# Patient Record
Sex: FEMALE
[De-identification: names, ages, dates, MRNs, and addresses within clinical notes are randomized per-mention and may not be internally consistent; named-entity substitution may affect disease eponyms.]

---

## 2020-04-13 ENCOUNTER — HOSPITAL ENCOUNTER (EMERGENCY)
Dept: HOSPITAL 43 - DL.ED | Age: 40
Discharge: NURSING, REHAB OR LONG-TERM CARE | End: 2020-04-13
Payer: COMMERCIAL

## 2020-04-13 DIAGNOSIS — R56.9: Primary | ICD-10-CM

## 2020-04-13 LAB
ANION GAP SERPL CALC-SCNC: 14.2 MEQ/L (ref 7–13)
CHLORIDE SERPL-SCNC: 104 MMOL/L (ref 98–107)
SODIUM SERPL-SCNC: 141 MMOL/L (ref 136–145)

## 2020-04-13 PROCEDURE — 81003 URINALYSIS AUTO W/O SCOPE: CPT

## 2020-04-13 PROCEDURE — 80305 DRUG TEST PRSMV DIR OPT OBS: CPT

## 2020-04-13 PROCEDURE — 85025 COMPLETE CBC W/AUTO DIFF WBC: CPT

## 2020-04-13 PROCEDURE — 99285 EMERGENCY DEPT VISIT HI MDM: CPT

## 2020-04-13 PROCEDURE — 70450 CT HEAD/BRAIN W/O DYE: CPT

## 2020-04-13 PROCEDURE — 80053 COMPREHEN METABOLIC PANEL: CPT

## 2020-04-13 PROCEDURE — 80307 DRUG TEST PRSMV CHEM ANLYZR: CPT

## 2020-04-13 PROCEDURE — 80185 ASSAY OF PHENYTOIN TOTAL: CPT

## 2020-04-13 PROCEDURE — 81025 URINE PREGNANCY TEST: CPT

## 2020-04-13 PROCEDURE — 96365 THER/PROPH/DIAG IV INF INIT: CPT

## 2020-04-13 PROCEDURE — 36415 COLL VENOUS BLD VENIPUNCTURE: CPT

## 2020-04-13 PROCEDURE — 99284 EMERGENCY DEPT VISIT MOD MDM: CPT

## 2020-04-13 NOTE — EDM.PDOC
ED HPI GENERAL MEDICAL PROBLEM





- General


Chief Complaint: Neuro Symptoms/Deficits


Time Seen by Provider: 04/13/20 21:05


Source of Information: Reports: Patient, EMS, EMS Notes Reviewed, Family, RN, 

RN Notes Reviewed


History Limitations: Reports: Altered Mental Status





- History of Present Illness


INITIAL COMMENTS - FREE TEXT/NARRATIVE: 


patient presents to ER per [facility] with complaint of seizure. Family called the 

ambulance as the patient had had a seizure at home that lasted an unknown 

amount of time. Patient was alert but very postictal when EMS arrived, was 

uncooperative when EMS attempted to touch the patient. EMS reports the patient 

had a seizure in their presence that lasted approximately 30 seconds while 

still in the home, and EMS did give him Versed 5 mg. EMS reported another 30 

second seizure en route to the hospital. Family states the patient has a 

seizure disorder and takes Dilantin. Unknown any other past health history. 

Family denies any recent trauma per EMS. 


GCS: 7





Onset: Today, Sudden





- Related Data


 Allergies











Allergy/AdvReac Type Severity Reaction Status Date / Time


 


Unable to Assess Allergy   Unverified 04/13/20 21:00











Home Meds: 


 Home Meds





. [Unable to Verify Home Med List]  04/13/20 [History]











ED ROS GENERAL





- Review of Systems


Review Of Systems: Comprehensive ROS is negative, except as noted in HPI.





- Physical Exam


Exam: See Below


Exam Limited By: Altered Mental Status


General Appearance: Obtunded, Other (snoring respirations)


Eye Exam: Bilateral Eye: EOMI, Normal Inspection, PERRL (3 BRISk)


Ears: Normal External Exam, Hearing Grossly Normal


Nose: Normal Inspection


Throat/Mouth: Normal Inspection, Normal Voice, No Airway Compromise


Head Exam: Atraumatic, Normocephalic


Neck: Normal Inspection, Supple, Non-Tender, Full Range of Motion


Respiratory/Chest: No Respiratory Distress, Lungs Clear, Normal Breath Sounds, 

No Accessory Muscle Use, Chest Non-Tender


Cardiovascular: Normal Peripheral Pulses, Regular Rate, Rhythm, No Edema, No 

Gallop, No JVD, No Murmur, No Rub


GI/Abdominal: Normal Bowel Sounds, Soft, Non-Tender, No Organomegaly, No 

Distention, No Abnormal Bruit, No Mass


 (Female) Exam: Deferred


Rectal (Female) Exam: Deferred


Neuro Exam (Abbreviated): Confused, Disoriented


Back Exam: Normal Inspection, Full Range of Motion, NT


Extremities: Normal Inspection, Normal Range of Motion, Non-Tender, No Pedal 

Edema, Normal Capillary Refill


Psychiatric: Flat Affect, Other (Patient sleeps with snoring respirations, 

easily arouses, but is combative when awake)


Skin Exam: Warm, Dry, Intact, Normal Color, No Rash





Course





- Vital Signs


Last Recorded V/S: 


 Last Vital Signs











Temp  97.5 F   04/13/20 21:03


 


Pulse  71   04/13/20 21:03


 


Resp  24 H  04/13/20 21:03


 


BP  115/72   04/13/20 21:03


 


Pulse Ox  98   04/13/20 21:03














- Orders/Labs/Meds


Orders: 


 Active Orders 24 hr











 Category Date Time Status


 


 Peripheral IV Care [RC] .AS DIRECTED Care  04/13/20 20:57 Active


 


 Head wo Cont [CT] Urgent Exams  04/13/20 21:32 Taken


 


 Sodium Chloride 0.9% [Normal Saline] 1,000 ml Med  04/13/20 23:30 Active





 IV ASDIRECTED   


 


 Sodium Chloride 0.9% [Saline Flush] Med  04/13/20 20:57 Active





 10 ml FLUSH ASDIRECTED PRN   


 


 Peripheral IV Insertion Adult [OM.PC] Stat Oth  04/13/20 20:56 Ordered


 


 Seizure Precautions [OM.PC] Routine Oth  04/13/20 21:43 Ordered








 Medication Orders





Sodium Chloride (Normal Saline)  1,000 mls @ 150 mls/hr IV ASDIRECTED JG


   Last Admin: 04/13/20 23:26  Dose: 150 mls/hr


Sodium Chloride (Saline Flush)  10 ml FLUSH ASDIRECTED PRN


   PRN Reason: Keep Vein Open


   Last Admin: 04/13/20 21:11  Dose: 10 ml








Labs: 


 Laboratory Tests











  04/13/20 04/13/20 04/13/20 Range/Units





  21:13 21:13 21:13 


 


WBC  8.8    (5.0-10.0)  10^3/uL


 


RBC  4.91    (4.2-5.4)  10^6/uL


 


Hgb  13.4    (12.0-16.0)  g/dL


 


Hct  41.6    (37.0-47.0)  %


 


MCV  84.7    ()  fL


 


MCH  27.3    (27.0-34.0)  pg


 


MCHC  32.2 L    (33.0-35.0)  g/dL


 


Plt Count  255    (150-450)  10^3/uL


 


Neut % (Auto)  76.4 H    (42.2-75.2)  %


 


Lymph % (Auto)  13.2 L    (20.5-50.1)  %


 


Mono % (Auto)  8.8 H    (2-8)  %


 


Eos % (Auto)  1.5    (1.0-3.0)  %


 


Baso % (Auto)  0.1    (0.0-1.0)  %


 


Sodium   141   (136-145)  mmol/L


 


Potassium   4.2   (3.5-5.1)  mmol/L


 


Chloride   104   ()  mmol/L


 


Carbon Dioxide   27   (21-32)  mmol/L


 


Anion Gap   14.2 H   (7-13)  mEq/L


 


BUN   11   (7-18)  mg/dL


 


Creatinine   0.92   (0.55-1.02)  mg/dL


 


Est Cr Clr Drug Dosing   TNP   


 


Estimated GFR (MDRD)   54   


 


BUN/Creatinine Ratio   12.0   (No establ ref range)  


 


Glucose   88   (74-99)  mg/dL


 


Calcium   8.7   (8.5-10.1)  mg/dL


 


Total Bilirubin   0.5   (0.2-1.0)  mg/dL


 


AST   20   (15-37)  U/L


 


ALT   30   (14-59)  U/L


 


Alkaline Phosphatase   81   ()  U/L


 


Total Protein   7.8   (6.4-8.2)  g/dL


 


Albumin   3.5   (3.4-5.0)  g/dL


 


Globulin   4.3   


 


Albumin/Globulin Ratio   0.8   


 


Urine Color     (YELLOW)  


 


Urine Appearance     (CLEAR)  


 


Urine pH     (5.0-9.0)  


 


Ur Specific Gravity     (1.005-1.030)  


 


Urine Protein     (NEGATIVE)  


 


Urine Glucose (UA)     (NEGATIVE)  


 


Urine Ketones     (NEGATIVE)  


 


Urine Occult Blood     (NEGATIVE)  


 


Urine Nitrite     (NEGATIVE)  


 


Urine Bilirubin     (NEGATIVE)  


 


Urine Urobilinogen     (0.2-1.0)  mg/dL


 


Ur Leukocyte Esterase     (NEGATIVE)  


 


Urine HCG, Qual     


 


Urine Opiates Screen     (NEGATIVE)  


 


Ur Oxycodone Screen     (NEGATIVE)  


 


Urine Methadone Screen     (NEGATIVE)  


 


Ur Barbiturates Screen     (NEGATIVE)  


 


Phenytoin    < 1 L  (10-20 (Therapeutic))  ug/mL


 


U Tricyclic Antidepress     (NEGATIVE)  


 


Ur Phencyclidine Scrn     (NEGATIVE)  


 


Ur Amphetamine Screen     (NEGATIVE)  


 


U Methamphetamines Scrn     (NEGATIVE)  


 


Urine MDMA Screen     (NEGATIVE)  


 


U Benzodiazepines Scrn     (NEGATIVE)  


 


Urine Cocaine Screen     (NEGATIVE)  


 


U Marijuana (THC) Screen     (NEGATIVE)  


 


Ethyl Alcohol   < 3   (0)  mg/dL














  04/13/20 04/13/20 04/13/20 Range/Units





  23:14 23:14 23:14 


 


WBC     (5.0-10.0)  10^3/uL


 


RBC     (4.2-5.4)  10^6/uL


 


Hgb     (12.0-16.0)  g/dL


 


Hct     (37.0-47.0)  %


 


MCV     ()  fL


 


MCH     (27.0-34.0)  pg


 


MCHC     (33.0-35.0)  g/dL


 


Plt Count     (150-450)  10^3/uL


 


Neut % (Auto)     (42.2-75.2)  %


 


Lymph % (Auto)     (20.5-50.1)  %


 


Mono % (Auto)     (2-8)  %


 


Eos % (Auto)     (1.0-3.0)  %


 


Baso % (Auto)     (0.0-1.0)  %


 


Sodium     (136-145)  mmol/L


 


Potassium     (3.5-5.1)  mmol/L


 


Chloride     ()  mmol/L


 


Carbon Dioxide     (21-32)  mmol/L


 


Anion Gap     (7-13)  mEq/L


 


BUN     (7-18)  mg/dL


 


Creatinine     (0.55-1.02)  mg/dL


 


Est Cr Clr Drug Dosing     


 


Estimated GFR (MDRD)     


 


BUN/Creatinine Ratio     (No establ ref range)  


 


Glucose     (74-99)  mg/dL


 


Calcium     (8.5-10.1)  mg/dL


 


Total Bilirubin     (0.2-1.0)  mg/dL


 


AST     (15-37)  U/L


 


ALT     (14-59)  U/L


 


Alkaline Phosphatase     ()  U/L


 


Total Protein     (6.4-8.2)  g/dL


 


Albumin     (3.4-5.0)  g/dL


 


Globulin     


 


Albumin/Globulin Ratio     


 


Urine Color  Yellow    (YELLOW)  


 


Urine Appearance  Clear    (CLEAR)  


 


Urine pH  6.5    (5.0-9.0)  


 


Ur Specific Gravity  1.020    (1.005-1.030)  


 


Urine Protein  Negative    (NEGATIVE)  


 


Urine Glucose (UA)  Negative    (NEGATIVE)  


 


Urine Ketones  Negative    (NEGATIVE)  


 


Urine Occult Blood  Negative    (NEGATIVE)  


 


Urine Nitrite  Negative    (NEGATIVE)  


 


Urine Bilirubin  Negative    (NEGATIVE)  


 


Urine Urobilinogen  0.2    (0.2-1.0)  mg/dL


 


Ur Leukocyte Esterase  Negative    (NEGATIVE)  


 


Urine HCG, Qual   Negative   


 


Urine Opiates Screen    Negative  (NEGATIVE)  


 


Ur Oxycodone Screen    Negative  (NEGATIVE)  


 


Urine Methadone Screen    Negative  (NEGATIVE)  


 


Ur Barbiturates Screen    Negative  (NEGATIVE)  


 


Phenytoin     (10-20 (Therapeutic))  ug/mL


 


U Tricyclic Antidepress    Negative  (NEGATIVE)  


 


Ur Phencyclidine Scrn    Negative  (NEGATIVE)  


 


Ur Amphetamine Screen    Negative  (NEGATIVE)  


 


U Methamphetamines Scrn    Positive H  (NEGATIVE)  


 


Urine MDMA Screen    Negative  (NEGATIVE)  


 


U Benzodiazepines Scrn    Negative  (NEGATIVE)  


 


Urine Cocaine Screen    Negative  (NEGATIVE)  


 


U Marijuana (THC) Screen    Negative  (NEGATIVE)  


 


Ethyl Alcohol     (0)  mg/dL











Meds: 


Medications











Generic Name Dose Route Start Last Admin





  Trade Name Freq  PRN Reason Stop Dose Admin


 


Sodium Chloride  1,000 mls @ 150 mls/hr  04/13/20 23:30  04/13/20 23:26





  Normal Saline  IV   150 mls/hr





  ASDIRECTED JG   Administration





     





     





     





     


 


Sodium Chloride  10 ml  04/13/20 20:57  04/13/20 21:11





  Saline Flush  FLUSH   10 ml





  ASDIRECTED PRN   Administration





  Keep Vein Open   





     





     





     














Discontinued Medications














Generic Name Dose Route Start Last Admin





  Trade Name Freq  PRN Reason Stop Dose Admin


 


Phenytoin Sodium 1,500 mg/  130 mls @ 260 mls/hr  04/13/20 20:58  04/13/20 21:10





  Sodium Chloride  IV  04/13/20 20:59  260 mls/hr





  ONETIME ONE   Administration





     





     





     





     


 


Sodium Chloride  1,000 mls @ 999 mls/hr  04/13/20 20:59  04/13/20 21:11





  Normal Saline  IV  04/13/20 21:59  999 mls/hr





  .BOLUS ONE   Administration





     





     





     





     














- Radiology Interpretation


Free Text/Narrative:: 


Head CT wo contrast:


PROCEDURE INFORMATION:


Exam: CT Head Without Contrast


Exam date and time: 4/13/2020 9:37 PM


Age: 52 years (approx. age) old


Clinical indication: Other: Seizure activity, unresponsive


TECHNIQUE:


Imaging protocol: Computed tomography of the head without contrast.


Radiation optimization: All CT scans at this facility use at least one of these 

dose optimization


techniques: automated exposure control; mA and/or kV adjustment per patient 

size (includes targeted


exams where dose is matched to clinical indication); or iterative 

reconstruction.


COMPARISON:


No relevant prior studies available.


FINDINGS:


Brain: Mild hypodensity is present in the white matter of both frontal lobes. 

Mild hypodensity is


present in the corona radiata of the left frontal lobe, consistent with changes 

due to old ischemic


disease. No midline shift.


Ventricles: Normal. No ventriculomegaly.


Bones/joints: Unremarkable. No acute fracture.


Sinuses: Visualized sinuses are unremarkable. No fluid levels.


Mastoid air cells: Visualized mastoid air cells are well aerated.


Soft tissues: Unremarkable.


Other findings: No acute hemorrhage.


IMPRESSION:


Foci of mild hypodensity in the white matter of the frontal lobes. This finding 

is potentially due to


changes of late subacute or early chronic infarction versus cerebritis. A 

component of acute on


subacute or chronic disease is not excluded. Correlate with history and 

physical.


Thank you for allowing us to participate in the care of your patient.


Dictated and Authenticated by: Jose Orantes MD


04/13/2020 10:04 PM Central Time ([place] & [place])





See rad report








- Re-Assessments/Exams


Free Text/Narrative Re-Assessment/Exam: 





04/13/20 23:26


Discussed patient case with Dr. Perez who agreed to accept the patient for 

transfer to [facility] in [place].








Departure





- Departure


Time of Disposition: 22:58


Disposition: DC/Tfer to [facility]


Condition: Serious


Clinical Impression: 


 Seizure








- Discharge Information


*PRESCRIPTION DRUG MONITORING PROGRAM REVIEWED*: No


*COPY OF PRESCRIPTION DRUG MONITORING REPORT IN PATIENT PK: No


Forms:  ED Department Discharge, Interfacility Transfer [facility]





Sepsis Event Note





- Evaluation


Sepsis Screening Result: No Definite Risk





- Focused Exam


Vital Signs: 


 Vital Signs











  Temp Pulse Resp BP Pulse Ox


 


 04/13/20 21:03  97.5 F  71  24 H  115/72  98











Date Exam was Performed: 04/13/20


Time Exam was Performed: 23:26





- My Orders


Last 24 Hours: 


My Active Orders





04/13/20 20:56


Peripheral IV Insertion Adult [OM.PC] Stat 





04/13/20 20:57


Peripheral IV Care [RC] .AS DIRECTED 


Sodium Chloride 0.9% [Saline Flush]   10 ml FLUSH ASDIRECTED PRN 





04/13/20 21:32


Head wo Cont [CT] Urgent 





04/13/20 21:43


Seizure Precautions [OM.PC] Routine 





04/13/20 23:30


Sodium Chloride 0.9% [Normal Saline] 1,000 ml IV ASDIRECTED 














- Assessment/Plan


Last 24 Hours: 


My Active Orders





04/13/20 20:56


Peripheral IV Insertion Adult [OM.PC] Stat 





04/13/20 20:57


Peripheral IV Care [RC] .AS DIRECTED 


Sodium Chloride 0.9% [Saline Flush]   10 ml FLUSH ASDIRECTED PRN 





04/13/20 21:32


Head wo Cont [CT] Urgent 





04/13/20 21:43


Seizure Precautions [OM.PC] Routine 





04/13/20 23:30


Sodium Chloride 0.9% [Normal Saline] 1,000 ml IV ASDIRECTED